# Patient Record
Sex: FEMALE | Race: BLACK OR AFRICAN AMERICAN | Employment: UNEMPLOYED | URBAN - NONMETROPOLITAN AREA
[De-identification: names, ages, dates, MRNs, and addresses within clinical notes are randomized per-mention and may not be internally consistent; named-entity substitution may affect disease eponyms.]

---
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
## 2023-03-04 ENCOUNTER — HOSPITAL ENCOUNTER (EMERGENCY)
Age: 18
Discharge: HOME | End: 2023-03-04
Payer: COMMERCIAL

## 2023-03-04 VITALS
SYSTOLIC BLOOD PRESSURE: 136 MMHG | RESPIRATION RATE: 16 BRPM | DIASTOLIC BLOOD PRESSURE: 84 MMHG | WEIGHT: 135 LBS | OXYGEN SATURATION: 100 % | HEIGHT: 67 IN | BODY MASS INDEX: 21.19 KG/M2 | HEART RATE: 73 BPM | TEMPERATURE: 97.2 F

## 2023-03-04 DIAGNOSIS — N91.2 AMENORRHEA: Primary | ICD-10-CM

## 2023-03-04 LAB — HCG INTACT+B SERPL-ACNC: < 1 MIU/ML (ref 0–5)

## 2023-03-04 PROCEDURE — 84702 CHORIONIC GONADOTROPIN TEST: CPT

## 2023-03-04 PROCEDURE — 36415 COLL VENOUS BLD VENIPUNCTURE: CPT

## 2023-03-04 PROCEDURE — 99283 EMERGENCY DEPT VISIT LOW MDM: CPT

## 2023-03-04 ASSESSMENT — ENCOUNTER SYMPTOMS
VOMITING: 0
NAUSEA: 0
COUGH: 0
SORE THROAT: 0
DIARRHEA: 0

## 2023-03-04 ASSESSMENT — PAIN - FUNCTIONAL ASSESSMENT: PAIN_FUNCTIONAL_ASSESSMENT: NONE - DENIES PAIN

## 2023-03-04 NOTE — ED TRIAGE NOTES
Pt presents to ED for a HCG level. 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 Last one was a month ago, states she wants a repeat HCG to make sure her levels are decreasing or 0. States she is due to start her menses in a week, has been sexually active w/o contraceptives. Is resting comfortably in chair, denies further needs, call light in hand, VSS.

## 2023-03-04 NOTE — ED PROVIDER NOTES
[facility] [address] [address] EMERGENCY DEPT      EMERGENCY MEDICINE     Pt Name: Shaun Thomas  MRN: [id number]  [place] 2005  Date of evaluation: 3/4/2023  Provider: AUREA Ceja    CHIEF COMPLAINT       Chief Complaint   Patient presents with    Pregnancy Test     HISTORY OF PRESENT ILLNESS   Shaun Thomas is a pleasant 21 y.o. female who presents to the emergency department from from home, as a walk in to the ED lobby requesting a beta hCG. The patient miscarried and mid-January and has not had menses since then. She was concerned what the actual level of the beta-hCG was and if it and return down to 0. She is G3, P0 AB 3. She has not taken a pregnancy test at home. .      Review Of Systems   Review of Systems   Constitutional:  Negative for chills and fever. HENT:  Negative for congestion, ear pain and sore throat. Respiratory:  Negative for cough. Cardiovascular:  Negative for chest pain and palpitations. Gastrointestinal:  Negative for diarrhea, nausea and vomiting. Genitourinary:  Negative for dysuria and urgency. Musculoskeletal:  Negative for myalgias and neck pain. Skin:  Negative for rash. All other systems reviewed and are negative. PASTMEDICAL HISTORY     Past Medical History:   Diagnosis Date    Eczema     Seasonal allergies        There is no problem list on file for this patient.     SURGICAL HISTORY       Past Surgical History:   Procedure Laterality Date    ARM SURGERY Left     arm was broken       CURRENT MEDICATIONS       Discharge Medication List as of 3/4/2023 11:55 AM        CONTINUE these medications which have NOT CHANGED    Details   predniSONE (DELTASONE) 20 MG tablet Take 3 tablets by mouth once daily for 5 days, Disp-15 tablet, R-0Normal      acetaminophen (TYLENOL) 500 MG tablet Take 1 tablet by mouth every 4 hours as needed for Pain or Fever, Disp-20 tablet, R-0OTC      ascorbic acid (VITAMIN C) 500 MG tablet Take 1 tablet by mouth 2 times daily for 7 days, Disp-14 tablet, R-0OTC      zinc sulfate (ZINCATE) 220 (50 Zn) MG capsule Take 1 capsule by mouth daily for 7 days, Disp-7 capsule, R-0OTC      azelastine (ASTELIN) 0.1 % nasal spray 1 spray by Nasal route 2 times daily Use in each nostril as directed, Disp-30 mL, R-0Normal      diclofenac sodium (VOLTAREN) 1 % GEL Apply 2 g topically 4 times daily, Topical, 4 TIMES DAILY Starting Sat 2021, Disp-100 g, R-0, Normal      Menthol-Camphor (TIGER BALM EXTRA STRENGTH) 11-10 % OINT Follow package directions forTopical use, R-0OTC      ondansetron (ZOFRAN ODT) 4 MG disintegrating tablet Take 1 tablet by mouth every 8 hours as needed for Nausea or Vomiting, Disp-21 tablet,R-0Normal      loratadine (CLARITIN) 10 MG tablet Take 10 mg by mouth dailyHistorical Med             ALLERGIES     is allergic to ibuprofen. FAMILY HISTORY     She indicated that her mother is alive. She indicated that her father is . SOCIAL HISTORY       Social History     Tobacco Use    Smoking status: Never    Smokeless tobacco: Never   Vaping Use    Vaping Use: Never used   Substance Use Topics    Alcohol use: No    Drug use: No       PHYSICAL EXAM       ED Triage Vitals [23 0943]   BP Temp Temp Source Heart Rate Resp SpO2 Height Weight   136/84 97.2 °F (36.2 °C) Oral 73 16 100 % 5' 7\" (1.702 m) 135 lb (61.2 kg)       Additional Vital Signs:  Vitals:    23 0943   BP: 136/84   Pulse: 73   Resp: 16   Temp: 97.2 °F (36.2 °C)   SpO2: 100%     Physical Exam  Vitals and nursing note reviewed. HENT:      Head: Normocephalic and atraumatic. Eyes:      Pupils: Pupils are equal, round, and reactive to light. Neck:      Thyroid: No thyromegaly. Trachea: No tracheal deviation. Cardiovascular:      Rate and Rhythm: Normal rate and regular rhythm. Heart sounds: No murmur heard. No friction rub. Pulmonary:      Effort: Pulmonary effort is normal. No respiratory distress.       Breath sounds: Normal breath sounds. No wheezing. Abdominal:      General: Bowel sounds are normal. There is no distension. Palpations: Abdomen is soft. Tenderness: There is no abdominal tenderness. Musculoskeletal:      Cervical back: Neck supple. Skin:     General: Skin is warm and dry. Findings: No erythema or rash. Neurological:      Mental Status: She is alert and oriented to person, place, and time. FORMAL DIAGNOSTIC RESULTS     RADIOLOGY: Interpretation per the Radiologist below, if available at the time of this note (none if blank): No orders to display       LABS: (none if blank)  Labs Reviewed   HCG, QUANTITATIVE, PREGNANCY       (Any cultures that may have been sent were not resulted at the time of this patient visit)    [address] / ED COURSE:     1) Number and Complexity of Problems            Problem List This Visit:         Chief Complaint   Patient presents with    Pregnancy Test   Amenorrhea        Differential Diagnosis includes (but not limited to):  Amenorrhea, pregnancy,        Diagnoses Considered but I have low suspicion of:   Retained products of conception             Pertinent Comorbid Conditions:    None    2)  Data Reviewed (none if left blank)          My Independent interpretations:     EKG:      None    Imaging: None    Labs:      None                 Decision Rules/Clinical Scores utilized:  None            External Documentation Reviewed:         Previous patient encounter documents & history available on EMR was reviewed yes             See Formal Diagnostic Results above for the lab and radiology tests and orders.     3)  Treatment and Disposition         ED Reassessment: Stable         Case discussed with (none if left blank)         Shared Decision-Making was performed and disposition discussed with the        Patient/Family and questions answered yes         Social determinants of health impacting treatment or disposition:  None         Code Status: N/A      Summary of Patient Presentation:      MDM     Amount and/or Complexity of Data Reviewed  Clinical lab tests: ordered and reviewed  Discussion of test results with the performing providers: no  Decide to obtain previous medical records or to obtain history from someone other than the patient: yes  Obtain history from someone other than the patient: no  Review and summarize past medical records: yes  Discuss the patient with other providers: no  Independent visualization of images, tracings, or specimens: no    Risk of Complications, Morbidity, and/or Mortality  Presenting problems: low  Diagnostic procedures: low  Management options: low    Patient Progress  Patient progress: stable  /   Vitals Reviewed:    Vitals:    03/04/23 0943   BP: 136/84   Pulse: 73   Resp: 16   Temp: 97.2 °F (36.2 °C)   TempSrc: Oral   SpO2: 100%   Weight: 135 lb (61.2 kg)   Height: 5' 7\" (1.702 m)       The patient was seen and examined. Appropriate diagnostic testing was performed and results reviewed with the patient. The results of pertinent diagnostic studies and exam findings were discussed. The patients provisional diagnosis and plan of care were discussed with the patient and present family who expressed understanding. Any medications were reviewed and indications and risks of medications were discussed with the patient /family present. Strict verbal and written return precautions, instructions and appropriate follow-up provided to  the patient . ED Medications administered this visit:  (None if blank)  Medications - No data to display      PROCEDURES: (None if blank)      CRITICAL CARE: (None if blank)      DISCHARGE PRESCRIPTIONS: (None if blank)  Discharge Medication List as of 3/4/2023 11:55 AM          FINAL IMPRESSION      1.  Amenorrhea          DISPOSITION/PLAN   DISPOSITION Decision To Discharge 03/04/2023 11:55:11 AM      OUTPATIENT FOLLOW UP THE PATIENT:  ARON Reyes - CNP  [address] [facility] [address]  [phone]          XOGIQU V PCTPJD, PA       [place], [place]  03/04/23 9983

## 2023-06-04 ENCOUNTER — HOSPITAL ENCOUNTER (EMERGENCY)
Age: 18
Discharge: HOME | End: 2023-06-04

## 2023-06-04 VITALS
HEART RATE: 83 BPM | BODY MASS INDEX: 21.97 KG/M2 | WEIGHT: 140 LBS | TEMPERATURE: 98.1 F | SYSTOLIC BLOOD PRESSURE: 129 MMHG | RESPIRATION RATE: 18 BRPM | DIASTOLIC BLOOD PRESSURE: 92 MMHG | OXYGEN SATURATION: 99 % | HEIGHT: 67 IN

## 2023-06-04 DIAGNOSIS — M54.50 ACUTE EXACERBATION OF CHRONIC LOW BACK PAIN: Primary | ICD-10-CM

## 2023-06-04 DIAGNOSIS — G89.29 ACUTE EXACERBATION OF CHRONIC LOW BACK PAIN: Primary | ICD-10-CM

## 2023-06-04 RX ORDER — NAPROXEN 500 MG/1
500 TABLET ORAL 2 TIMES DAILY WITH MEALS
Qty: 10 TABLET | Refills: 0 | Status: SHIPPED | OUTPATIENT
Start: 2023-06-04 | End: 2023-06-09

## 2023-06-04 ASSESSMENT — ENCOUNTER SYMPTOMS
SHORTNESS OF BREATH: 0
ABDOMINAL PAIN: 0
COUGH: 0
BACK PAIN: 1

## 2023-06-04 ASSESSMENT — PAIN - FUNCTIONAL ASSESSMENT: PAIN_FUNCTIONAL_ASSESSMENT: NONE - DENIES PAIN

## 2023-08-01 ENCOUNTER — HOSPITAL ENCOUNTER (EMERGENCY)
Age: 18
Discharge: HOME | End: 2023-08-01
Attending: OPHTHALMOLOGY
Payer: COMMERCIAL

## 2023-08-01 VITALS
DIASTOLIC BLOOD PRESSURE: 97 MMHG | HEART RATE: 100 BPM | TEMPERATURE: 96.8 F | SYSTOLIC BLOOD PRESSURE: 128 MMHG | RESPIRATION RATE: 16 BRPM | OXYGEN SATURATION: 99 %

## 2023-08-01 DIAGNOSIS — F43.0 ACUTE STRESS REACTION: Primary | ICD-10-CM

## 2023-08-01 LAB
AMPHETAMINES UR QL SCN: NEGATIVE
ANION GAP SERPL CALC-SCNC: 10 MEQ/L (ref 8–16)
APAP SERPL-MCNC: < 5 UG/ML (ref 0–20)
B-HCG SERPL QL: NEGATIVE
BARBITURATES UR QL SCN: NEGATIVE
BASOPHILS ABSOLUTE: 0 THOU/MM3 (ref 0–0.1)
BASOPHILS NFR BLD AUTO: 0.4 %
BENZODIAZ UR QL SCN: NEGATIVE
BUN SERPL-MCNC: 12 MG/DL (ref 7–22)
BZE UR QL SCN: NEGATIVE
CALCIUM SERPL-MCNC: 9.2 MG/DL (ref 8.5–10.5)
CANNABINOIDS UR QL SCN: NEGATIVE
CHLORIDE SERPL-SCNC: 105 MEQ/L (ref 98–111)
CO2 SERPL-SCNC: 25 MEQ/L (ref 23–33)
CREAT SERPL-MCNC: 0.7 MG/DL (ref 0.4–1.2)
DEPRECATED RDW RBC AUTO: 44.3 FL (ref 35–45)
EOSINOPHIL NFR BLD AUTO: 0.4 %
EOSINOPHILS ABSOLUTE: 0 THOU/MM3 (ref 0–0.4)
ERYTHROCYTE [DISTWIDTH] IN BLOOD BY AUTOMATED COUNT: 14.6 % (ref 11.5–14.5)
ETHANOL SERPL-MCNC: < 0.01 %
FENTANYL: NEGATIVE
GFR SERPL CREATININE-BSD FRML MDRD: > 60 ML/MIN/1.73M2
GLUCOSE SERPL-MCNC: 93 MG/DL (ref 70–108)
HCT VFR BLD AUTO: 33.5 % (ref 37–47)
HGB BLD-MCNC: 10.3 GM/DL (ref 12–16)
IMM GRANULOCYTES # BLD AUTO: 0.02 THOU/MM3 (ref 0–0.07)
IMM GRANULOCYTES NFR BLD AUTO: 0.3 %
LYMPHOCYTES ABSOLUTE: 0.7 THOU/MM3 (ref 1–4.8)
LYMPHOCYTES NFR BLD AUTO: 9.2 %
MCH RBC QN AUTO: 25.7 PG (ref 26–33)
MCHC RBC AUTO-ENTMCNC: 30.7 GM/DL (ref 32.2–35.5)
MCV RBC AUTO: 83.5 FL (ref 81–99)
MONOCYTES ABSOLUTE: 0.3 THOU/MM3 (ref 0.4–1.3)
MONOCYTES NFR BLD AUTO: 3.9 %
NEUTROPHILS NFR BLD AUTO: 85.8 %
NRBC BLD AUTO-RTO: 0 /100 WBC
OPIATES UR QL SCN: NEGATIVE
OSMOLALITY SERPL CALC.SUM OF ELEC: 278.9 MOSMOL/KG (ref 275–300)
OXYCODONE, OPI5M: NEGATIVE
PCP UR QL SCN: NEGATIVE
PLATELET # BLD AUTO: 302 THOU/MM3 (ref 130–400)
PMV BLD AUTO: 9.5 FL (ref 9.4–12.4)
POTASSIUM SERPL-SCNC: 4.3 MEQ/L (ref 3.5–5.2)
RBC # BLD AUTO: 4.01 MILL/MM3 (ref 4.2–5.4)
SALICYLATES SERPL-MCNC: < 0.3 MG/DL (ref 2–10)
SEGMENTED NEUTROPHILS ABSOLUTE COUNT: 6.4 THOU/MM3 (ref 1.8–7.7)
SODIUM SERPL-SCNC: 140 MEQ/L (ref 135–145)
WBC # BLD AUTO: 7.5 THOU/MM3 (ref 4.8–10.8)

## 2023-08-01 PROCEDURE — 80048 BASIC METABOLIC PNL TOTAL CA: CPT

## 2023-08-01 PROCEDURE — 85025 COMPLETE CBC W/AUTO DIFF WBC: CPT

## 2023-08-01 PROCEDURE — 82077 ASSAY SPEC XCP UR&BREATH IA: CPT

## 2023-08-01 PROCEDURE — 99283 EMERGENCY DEPT VISIT LOW MDM: CPT

## 2023-08-01 PROCEDURE — 80179 DRUG ASSAY SALICYLATE: CPT

## 2023-08-01 PROCEDURE — 80143 DRUG ASSAY ACETAMINOPHEN: CPT

## 2023-08-01 PROCEDURE — 80307 DRUG TEST PRSMV CHEM ANLYZR: CPT

## 2023-08-01 PROCEDURE — 84703 CHORIONIC GONADOTROPIN ASSAY: CPT

## 2023-08-01 PROCEDURE — 36415 COLL VENOUS BLD VENIPUNCTURE: CPT

## 2023-08-01 ASSESSMENT — LIFESTYLE VARIABLES
HOW MANY STANDARD DRINKS CONTAINING ALCOHOL DO YOU HAVE ON A TYPICAL DAY: 1 OR 2
HOW OFTEN DO YOU HAVE A DRINK CONTAINING ALCOHOL: MONTHLY OR LESS

## 2023-08-01 ASSESSMENT — PAIN - FUNCTIONAL ASSESSMENT: PAIN_FUNCTIONAL_ASSESSMENT: NONE - DENIES PAIN

## 2023-08-01 ASSESSMENT — SLEEP AND FATIGUE QUESTIONNAIRES
AVERAGE NUMBER OF SLEEP HOURS: 9
DO YOU USE A SLEEP AID: NO
DO YOU HAVE DIFFICULTY SLEEPING: NO

## 2023-08-01 NOTE — ED NOTES
Patient placed in safe room that is ligature resistant with continuous monitoring in place. Provider notified, requested an assessment by behavioral health . Patient belongings secured in a locked lockers outside of the room. Explained suicide prevention precautions to the patient including constant observer.         Darius Marques RN  08/01/23 9321

## 2023-08-01 NOTE — ED NOTES
Pt visiting with her mother at this time. Pt tearful, but calm. Pt denies any needs at this time.      Rachael Kee RN  08/01/23 7197

## 2023-08-01 NOTE — ED NOTES
Patient resting in bed. Respirations easy and unlabored. No distress noted. Will monitor.         Meek Rae RN  08/01/23 3811

## 2023-08-01 NOTE — ED NOTES
Pt denies needing to void at this time. Pt states she voided prior to arrival.  Water provided to patient. Pt's mother continues at bedside.      Olive Chapin RN  08/01/23 4719
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
## 2023-09-16 ENCOUNTER — HOSPITAL ENCOUNTER (EMERGENCY)
Age: 18
Discharge: HOME | End: 2023-09-16
Attending: STUDENT IN AN ORGANIZED HEALTH CARE EDUCATION/TRAINING PROGRAM
Payer: COMMERCIAL

## 2023-09-16 VITALS
HEIGHT: 67 IN | OXYGEN SATURATION: 95 % | DIASTOLIC BLOOD PRESSURE: 90 MMHG | BODY MASS INDEX: 20.4 KG/M2 | RESPIRATION RATE: 16 BRPM | SYSTOLIC BLOOD PRESSURE: 128 MMHG | HEART RATE: 58 BPM | TEMPERATURE: 98.2 F | WEIGHT: 130 LBS

## 2023-09-16 DIAGNOSIS — Z32.02 PREGNANCY EXAMINATION OR TEST, NEGATIVE RESULT: Primary | ICD-10-CM

## 2023-09-16 LAB — B-HCG SERPL QL: NEGATIVE

## 2023-09-16 PROCEDURE — 36415 COLL VENOUS BLD VENIPUNCTURE: CPT

## 2023-09-16 PROCEDURE — 99283 EMERGENCY DEPT VISIT LOW MDM: CPT

## 2023-09-16 PROCEDURE — 84703 CHORIONIC GONADOTROPIN ASSAY: CPT

## 2023-09-16 ASSESSMENT — PAIN DESCRIPTION - PAIN TYPE: TYPE: ACUTE PAIN

## 2023-09-16 ASSESSMENT — PAIN DESCRIPTION - DESCRIPTORS: DESCRIPTORS: CRAMPING

## 2023-09-16 ASSESSMENT — PAIN DESCRIPTION - LOCATION: LOCATION: ABDOMEN

## 2023-09-16 ASSESSMENT — PAIN SCALES - GENERAL: PAINLEVEL_OUTOF10: 0

## 2023-09-16 ASSESSMENT — PAIN - FUNCTIONAL ASSESSMENT: PAIN_FUNCTIONAL_ASSESSMENT: 0-10

## 2023-09-16 NOTE — ED TRIAGE NOTES
Patient presents to ER for blood pregnancy test. Patient reports taking at home pregnancy test and has faint line. Patient requesting blood test. Patient asking as soon as she gets blood work if she can leave and see result on My-chart.

## 2023-09-24 ENCOUNTER — HOSPITAL ENCOUNTER (EMERGENCY)
Age: 18
Discharge: HOME | End: 2023-09-24
Payer: COMMERCIAL

## 2023-09-24 VITALS
SYSTOLIC BLOOD PRESSURE: 125 MMHG | DIASTOLIC BLOOD PRESSURE: 71 MMHG | HEIGHT: 67 IN | RESPIRATION RATE: 16 BRPM | HEART RATE: 73 BPM | BODY MASS INDEX: 21.19 KG/M2 | TEMPERATURE: 98.7 F | WEIGHT: 135 LBS | OXYGEN SATURATION: 100 %

## 2023-09-24 DIAGNOSIS — Z02.89 ENCOUNTER TO OBTAIN EXCUSE FROM WORK: Primary | ICD-10-CM

## 2023-09-24 PROCEDURE — 99213 OFFICE O/P EST LOW 20 MIN: CPT

## 2023-09-24 ASSESSMENT — PAIN DESCRIPTION - DESCRIPTORS: DESCRIPTORS: ACHING

## 2023-09-24 ASSESSMENT — ENCOUNTER SYMPTOMS
COUGH: 0
DIARRHEA: 0
ABDOMINAL PAIN: 0
VOMITING: 0
SINUS PRESSURE: 0
SHORTNESS OF BREATH: 0
SORE THROAT: 0
NAUSEA: 0

## 2023-09-24 ASSESSMENT — PAIN DESCRIPTION - LOCATION: LOCATION: HEAD

## 2023-09-24 ASSESSMENT — PAIN - FUNCTIONAL ASSESSMENT: PAIN_FUNCTIONAL_ASSESSMENT: 0-10

## 2023-09-24 ASSESSMENT — PAIN SCALES - GENERAL: PAINLEVEL_OUTOF10: 3

## 2023-09-24 ASSESSMENT — PAIN DESCRIPTION - PAIN TYPE: TYPE: ACUTE PAIN

## 2023-09-24 NOTE — ED PROVIDER NOTES
[facility] Encounter      [address]       Chief Complaint   Patient presents with    Headache     Pt c/o headache onset 9/22/23 took migraine med which helped    Facial Pain     Sinus congestion 9/22/23 resolved    Letter for School/Work     Needs return to work slip       Nurses Notes reviewed and I agree except as noted in the HPI. HISTORY OF PRESENT ILLNESS   Marizol Saha is a 19 y.o. female who presents to the urgent care for evaluation. She states for the last couple of days she did not feel well she had a headache and some sinus pain and congestion. However today she feels better she has no concerns or complaints and is here to request for a work note to return to work tomorrow. 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 REVIEW OF SYSTEMS     Review of Systems   Constitutional:  Negative for chills, fatigue and fever. HENT:  Negative for congestion, ear pain, sinus pressure and sore throat. Respiratory:  Negative for cough and shortness of breath. Cardiovascular:  Negative for chest pain. Gastrointestinal:  Negative for abdominal pain, diarrhea, nausea and vomiting. Skin:  Negative for rash. Allergic/Immunologic: Negative for environmental allergies and food allergies. Neurological:  Negative for headaches. Hematological:  Negative for adenopathy. PAST MEDICAL HISTORY         Diagnosis Date    Eczema     Seasonal allergies        SURGICAL HISTORY     Patient  has a past surgical history that includes Arm Surgery (Left).     CURRENT MEDICATIONS       Discharge Medication List as of 9/24/2023  3:59 PM        CONTINUE these medications which have NOT CHANGED    Details   naproxen (NAPROSYN) 500 MG tablet Take 1 tablet by mouth 2 times daily (with meals) for 10 doses, Disp-10 tablet, R-0Normal      loratadine (CLARITIN) 10 MG tablet Take 1 tablet by mouth dailyHistorical Med             ALLERGIES

## 2023-09-24 NOTE — ED TRIAGE NOTES
Pt c/o headache onset 9/22/23 took migraine med which helped  & sinus congestion 9/22/23 resolved per patient.  Requesting return to work slip

## 2023-10-06 ENCOUNTER — HOSPITAL ENCOUNTER (EMERGENCY)
Age: 18
Discharge: HOME | End: 2023-10-06
Payer: COMMERCIAL

## 2023-10-06 VITALS
HEART RATE: 68 BPM | SYSTOLIC BLOOD PRESSURE: 120 MMHG | BODY MASS INDEX: 21.97 KG/M2 | DIASTOLIC BLOOD PRESSURE: 79 MMHG | OXYGEN SATURATION: 100 % | TEMPERATURE: 98.7 F | HEIGHT: 67 IN | WEIGHT: 140 LBS | RESPIRATION RATE: 18 BRPM

## 2023-10-06 DIAGNOSIS — Z20.2 EXPOSURE TO CHLAMYDIA: Primary | ICD-10-CM

## 2023-10-06 PROCEDURE — 6370000000 HC RX 637 (ALT 250 FOR IP): Performed by: NURSE PRACTITIONER

## 2023-10-06 PROCEDURE — 99213 OFFICE O/P EST LOW 20 MIN: CPT | Performed by: NURSE PRACTITIONER

## 2023-10-06 PROCEDURE — 99213 OFFICE O/P EST LOW 20 MIN: CPT

## 2023-10-06 RX ORDER — LACTOBACILLUS RHAMNOSUS GG 10B CELL
1 CAPSULE ORAL DAILY
Qty: 60 CAPSULE | Refills: 0 | Status: SHIPPED | OUTPATIENT
Start: 2023-10-06

## 2023-10-06 RX ORDER — AZITHROMYCIN 250 MG/1
1000 TABLET, FILM COATED ORAL ONCE
Status: COMPLETED | OUTPATIENT
Start: 2023-10-06 | End: 2023-10-06

## 2023-10-06 RX ADMIN — AZITHROMYCIN DIHYDRATE 1000 MG: 250 TABLET ORAL at 11:47

## 2023-10-06 ASSESSMENT — ENCOUNTER SYMPTOMS
SHORTNESS OF BREATH: 0
COUGH: 0
STRIDOR: 0
APNEA: 0
CHOKING: 0
CHEST TIGHTNESS: 0
WHEEZING: 0

## 2023-10-06 ASSESSMENT — PAIN - FUNCTIONAL ASSESSMENT: PAIN_FUNCTIONAL_ASSESSMENT: NONE - DENIES PAIN

## 2023-10-06 NOTE — ED PROVIDER NOTES
[facility]  Urgent Care Encounter      CHIEF COMPLAINT       Chief Complaint   Patient presents with    Exposure to STD     Chlamydia       Nurses Notes reviewed and I agree except as noted in the HPI. HISTORY OFPRESENT ILLNESS   Lucia Oliveros is a 19 y.o. The history is provided by the patient. No  was used. Sexually Transmitted Diseases   This is a new problem. The current episode started less than 1 hour ago. The problem has not changed since onset. There was no discharge from the vagina. Pertinent negatives include no fever, no abdominal discomfort, no genital burning, no genital growths, no genital itching, no genital lesions, no painful intercourse, no pelvic pain, no perineal odor, no perineal pain, no perineal swelling and no urinary burning. She has tried nothing for the symptoms. Risk factors: current partner reported exposure. REVIEW OF SYSTEMS     Review of Systems   Constitutional:  Negative for activity change, appetite change, chills, diaphoresis, fatigue and fever. Respiratory:  Negative for apnea, cough, choking, chest tightness, shortness of breath, wheezing and stridor. Cardiovascular:  Negative for chest pain, palpitations and leg swelling. Genitourinary:  Negative for pelvic pain. PAST MEDICAL HISTORY         Diagnosis Date    Eczema     Seasonal allergies        SURGICAL HISTORY     Patient  has a past surgical history that includes Arm Surgery (Left). CURRENT MEDICATIONS       Discharge Medication List as of 10/6/2023 11:49 AM        CONTINUE these medications which have NOT CHANGED    Details   loratadine (CLARITIN) 10 MG tablet Take 1 tablet by mouth dailyHistorical Med             ALLERGIES     Patient is is allergic to ibuprofen. FAMILY HISTORY     Patient's family history includes Diabetes in her mother; Heart Disease in her father; High Blood Pressure in her father and mother; Other in her father.     SOCIAL

## 2023-10-06 NOTE — ED TRIAGE NOTES
Pt to [facility] ambulatory with exposure to STD(Chlamydia). Pt was told today she may have a STD. Pt has no s/s of STD.

## 2024-01-24 ENCOUNTER — HOSPITAL ENCOUNTER (EMERGENCY)
Age: 19
Discharge: HOME | End: 2024-01-24
Payer: COMMERCIAL

## 2024-01-24 VITALS
WEIGHT: 135 LBS | OXYGEN SATURATION: 100 % | TEMPERATURE: 98.8 F | RESPIRATION RATE: 18 BRPM | DIASTOLIC BLOOD PRESSURE: 91 MMHG | SYSTOLIC BLOOD PRESSURE: 138 MMHG | BODY MASS INDEX: 21.19 KG/M2 | HEIGHT: 67 IN | HEART RATE: 68 BPM

## 2024-01-24 DIAGNOSIS — Z20.2 EXPOSURE TO STD: Primary | ICD-10-CM

## 2024-01-24 PROCEDURE — 99213 OFFICE O/P EST LOW 20 MIN: CPT

## 2024-01-24 PROCEDURE — 99213 OFFICE O/P EST LOW 20 MIN: CPT | Performed by: EMERGENCY MEDICINE

## 2024-01-24 RX ORDER — DOXYCYCLINE HYCLATE 100 MG
100 TABLET ORAL 2 TIMES DAILY
Qty: 14 TABLET | Refills: 0 | Status: SHIPPED | OUTPATIENT
Start: 2024-01-24 | End: 2024-01-31

## 2024-01-24 ASSESSMENT — ENCOUNTER SYMPTOMS
ABDOMINAL PAIN: 1
NAUSEA: 0
VOMITING: 1

## 2024-01-24 ASSESSMENT — PAIN - FUNCTIONAL ASSESSMENT: PAIN_FUNCTIONAL_ASSESSMENT: 0-10

## 2024-01-24 ASSESSMENT — PAIN DESCRIPTION - DESCRIPTORS: DESCRIPTORS: CRAMPING

## 2024-01-24 ASSESSMENT — PAIN DESCRIPTION - LOCATION: LOCATION: ABDOMEN

## 2024-01-24 ASSESSMENT — PAIN DESCRIPTION - ORIENTATION: ORIENTATION: LOWER

## 2024-01-24 ASSESSMENT — PAIN SCALES - GENERAL: PAINLEVEL_OUTOF10: 4

## 2024-01-24 NOTE — DISCHARGE INSTRUCTIONS
Doxycycline as directed until gone    No sex x 2 weeks    Return for new or worsening symptoms or if symptoms do not resolve in 3 to 4 days

## 2024-01-24 NOTE — ED PROVIDER NOTES
[facility] URGENT CARE  Urgent Care Encounter       CHIEF COMPLAINT       Chief Complaint   Patient presents with    Emesis    Abdominal Pain    Letter for School/Work       Nurses Notes reviewed and I agree except as noted in the HPI.  HISTORY OF PRESENT ILLNESS   Keyanna Montero is a 18 y.o. female who presents for concerns for STD exposure.  Patient states she is in a monogamous relationship.  Her partner and she was treated for chlamydia several months ago.  However, the patient's significant other developed symptoms of chlamydia and was tested yesterday.  He was positive.  He informed her today that she was positive and is here for treatment.  She states she does not want tested and would only like treated.  She reports he was negative for gonorrhea and positive for chlamydia.  She reports she has some mild lower GI discomfort.  No significant vaginal discharge.  No back pain.  No dysuria, frequency or urgency.  The patient reports that she had to miss part of her shift for work to be treated tonight and also will require a work excuse    HPI    REVIEW OF SYSTEMS     Review of Systems   Constitutional:  Negative for activity change, fatigue and fever.   Gastrointestinal:  Positive for abdominal pain and vomiting (once). Negative for nausea.   Genitourinary:  Negative for difficulty urinating, dysuria, frequency, urgency and vaginal discharge.       PAST MEDICAL HISTORY         Diagnosis Date    Eczema     Seasonal allergies        SURGICALHISTORY     Patient  has a past surgical history that includes Arm Surgery (Left).    CURRENT MEDICATIONS       Discharge Medication List as of 1/24/2024  6:57 PM        CONTINUE these medications which have NOT CHANGED    Details   lactobacillus (CULTURELLE) capsule Take 1 capsule by mouth daily, Disp-60 capsule, R-0Normal      loratadine (CLARITIN) 10 MG tablet Take 1 tablet by mouth dailyHistorical Med             ALLERGIES     Patient is is allergic to

## 2024-01-24 NOTE — ED NOTES
Pt with complaints of vomiting and lower abdominal pain that started today. States abdominal pain comes and goes. States her boyfriend informed her he had chlamydia and she would like treatment but not tested. States she needs a letter for work.     Michel Cee LPN  01/24/24 0837

## 2024-04-06 ENCOUNTER — HOSPITAL ENCOUNTER (EMERGENCY)
Age: 19
Discharge: HOME | End: 2024-04-06
Payer: COMMERCIAL

## 2024-04-06 VITALS
BODY MASS INDEX: 22.76 KG/M2 | TEMPERATURE: 98.3 F | SYSTOLIC BLOOD PRESSURE: 127 MMHG | HEIGHT: 67 IN | DIASTOLIC BLOOD PRESSURE: 78 MMHG | OXYGEN SATURATION: 100 % | HEART RATE: 91 BPM | WEIGHT: 145 LBS | RESPIRATION RATE: 18 BRPM

## 2024-04-06 DIAGNOSIS — N91.2 AMENORRHEA: Primary | ICD-10-CM

## 2024-04-06 LAB — HCG INTACT+B SERPL-ACNC: 2397 MIU/ML (ref 0–5)

## 2024-04-06 PROCEDURE — 84702 CHORIONIC GONADOTROPIN TEST: CPT

## 2024-04-06 PROCEDURE — 36415 COLL VENOUS BLD VENIPUNCTURE: CPT

## 2024-04-06 PROCEDURE — 99283 EMERGENCY DEPT VISIT LOW MDM: CPT

## 2024-04-06 NOTE — ED PROVIDER NOTES
[facility] EMERGENCY DEPT      EMERGENCY MEDICINE     Pt Name: Keyanna Montero  MRN: [id number]  Birthdate 2005  Date of evaluation: 2024  Provider: AUREA Mcpherson    CHIEF COMPLAINT       Chief Complaint   Patient presents with    Pregnancy Test     HISTORY OF PRESENT ILLNESS   Keyanna Montero is a pleasant 18 y.o. female who presents to the emergency department from from home, by private vehicle for evaluation of complains of hematuria.  The patient's last period was on .  She is here requesting a quantitative hCG.  She not have any abdominal pain.  No vaginal bleeding.  She just simply with a quantitative hCG.          PASTMEDICAL HISTORY     Past Medical History:   Diagnosis Date    Eczema     Seasonal allergies        There is no problem list on file for this patient.    SURGICAL HISTORY       Past Surgical History:   Procedure Laterality Date    ARM SURGERY Left     arm was broken       CURRENT MEDICATIONS       Discharge Medication List as of 2024  1:25 PM        CONTINUE these medications which have NOT CHANGED    Details   lactobacillus (CULTURELLE) capsule Take 1 capsule by mouth daily, Disp-60 capsule, R-0Normal      loratadine (CLARITIN) 10 MG tablet Take 1 tablet by mouth dailyHistorical Med             ALLERGIES     is allergic to ibuprofen.    FAMILY HISTORY     She indicated that her mother is alive. She indicated that her father is .       SOCIAL HISTORY       Social History     Tobacco Use    Smoking status: Never     Passive exposure: Never    Smokeless tobacco: Never   Vaping Use    Vaping Use: Never used   Substance Use Topics    Alcohol use: No    Drug use: No       PHYSICAL EXAM       ED Triage Vitals   BP Temp Temp src Pulse Respirations SpO2 Height Weight - Scale   24 1220 24 1219 -- 24 1219 24 1219 24 1219 24 1219 24 1219   127/78 98.3 °F (36.8 °C)  91 18 100 % 1.702 m (5' 7\") 65.8 kg (145 lb)       Additional Vital